# Patient Record
Sex: FEMALE | Race: WHITE | NOT HISPANIC OR LATINO | Employment: OTHER | URBAN - NONMETROPOLITAN AREA
[De-identification: names, ages, dates, MRNs, and addresses within clinical notes are randomized per-mention and may not be internally consistent; named-entity substitution may affect disease eponyms.]

---

## 2017-05-12 ENCOUNTER — TRANSCRIBE ORDERS (OUTPATIENT)
Dept: ADMINISTRATIVE | Facility: HOSPITAL | Age: 67
End: 2017-05-12

## 2017-05-12 DIAGNOSIS — Z13.9 SCREENING: Primary | ICD-10-CM

## 2017-06-06 ENCOUNTER — HOSPITAL ENCOUNTER (OUTPATIENT)
Dept: MAMMOGRAPHY | Facility: HOSPITAL | Age: 67
Discharge: HOME | End: 2017-06-06
Admitting: INTERNAL MEDICINE

## 2017-06-06 DIAGNOSIS — Z13.9 SCREENING: ICD-10-CM

## 2017-06-06 PROCEDURE — 77063 BREAST TOMOSYNTHESIS BI: CPT

## 2017-06-06 PROCEDURE — G0202 SCR MAMMO BI INCL CAD: HCPCS

## 2017-06-26 ENCOUNTER — TRANSCRIBE ORDERS (OUTPATIENT)
Dept: ADMINISTRATIVE | Facility: HOSPITAL | Age: 67
End: 2017-06-26

## 2017-06-26 ENCOUNTER — HOSPITAL ENCOUNTER (OUTPATIENT)
Dept: GENERAL RADIOLOGY | Facility: HOSPITAL | Age: 67
Discharge: HOME | End: 2017-06-26
Admitting: INTERNAL MEDICINE

## 2017-06-26 DIAGNOSIS — M25.552 LEFT HIP PAIN: Primary | ICD-10-CM

## 2017-06-26 PROCEDURE — 73502 X-RAY EXAM HIP UNI 2-3 VIEWS: CPT

## 2017-12-07 ENCOUNTER — TRANSCRIBE ORDERS (OUTPATIENT)
Dept: BONE DENSITY | Facility: HOSPITAL | Age: 67
End: 2017-12-07

## 2017-12-07 ENCOUNTER — TRANSCRIBE ORDERS (OUTPATIENT)
Dept: ULTRASOUND IMAGING | Facility: HOSPITAL | Age: 67
End: 2017-12-07

## 2017-12-07 DIAGNOSIS — Z13.820 SCREENING FOR OSTEOPOROSIS: Primary | ICD-10-CM

## 2017-12-12 ENCOUNTER — TRANSCRIBE ORDERS (OUTPATIENT)
Dept: BONE DENSITY | Facility: HOSPITAL | Age: 67
End: 2017-12-12

## 2017-12-12 DIAGNOSIS — Z78.0 POST-MENOPAUSAL: Primary | ICD-10-CM

## 2018-01-04 ENCOUNTER — APPOINTMENT (OUTPATIENT)
Dept: BONE DENSITY | Facility: HOSPITAL | Age: 68
End: 2018-01-04

## 2018-01-04 DIAGNOSIS — Z78.0 POST-MENOPAUSAL: ICD-10-CM

## 2018-01-04 PROCEDURE — 77080 DXA BONE DENSITY AXIAL: CPT

## 2018-02-14 ENCOUNTER — HOSPITAL ENCOUNTER (OUTPATIENT)
Dept: GENERAL RADIOLOGY | Facility: HOSPITAL | Age: 68
Discharge: HOME | End: 2018-02-14
Admitting: FAMILY MEDICINE

## 2018-02-14 ENCOUNTER — LAB (OUTPATIENT)
Dept: LAB | Facility: HOSPITAL | Age: 68
End: 2018-02-14

## 2018-02-14 ENCOUNTER — TRANSCRIBE ORDERS (OUTPATIENT)
Dept: ADMINISTRATIVE | Facility: HOSPITAL | Age: 68
End: 2018-02-14

## 2018-02-14 DIAGNOSIS — J10.1 INFLUENZA B: ICD-10-CM

## 2018-02-14 DIAGNOSIS — J10.1 INFLUENZA B: Primary | ICD-10-CM

## 2018-02-14 PROCEDURE — 71046 X-RAY EXAM CHEST 2 VIEWS: CPT

## 2018-05-08 ENCOUNTER — TRANSCRIBE ORDERS (OUTPATIENT)
Dept: ADMINISTRATIVE | Facility: HOSPITAL | Age: 68
End: 2018-05-08

## 2018-05-08 DIAGNOSIS — Z12.39 SCREENING BREAST EXAMINATION: Primary | ICD-10-CM

## 2018-06-12 ENCOUNTER — APPOINTMENT (OUTPATIENT)
Dept: MAMMOGRAPHY | Facility: HOSPITAL | Age: 68
End: 2018-06-12

## 2018-06-18 ENCOUNTER — HOSPITAL ENCOUNTER (OUTPATIENT)
Dept: MAMMOGRAPHY | Facility: HOSPITAL | Age: 68
Discharge: HOME | End: 2018-06-18
Admitting: FAMILY MEDICINE

## 2018-06-18 DIAGNOSIS — Z12.39 SCREENING BREAST EXAMINATION: ICD-10-CM

## 2018-06-18 PROCEDURE — 77063 BREAST TOMOSYNTHESIS BI: CPT

## 2018-06-18 PROCEDURE — 77067 SCR MAMMO BI INCL CAD: CPT

## 2019-05-13 ENCOUNTER — TRANSCRIBE ORDERS (OUTPATIENT)
Dept: MAMMOGRAPHY | Facility: HOSPITAL | Age: 69
End: 2019-05-13

## 2019-05-13 DIAGNOSIS — Z12.39 SCREENING FOR BREAST CANCER: Primary | ICD-10-CM

## 2019-07-03 ENCOUNTER — HOSPITAL ENCOUNTER (OUTPATIENT)
Dept: MAMMOGRAPHY | Facility: HOSPITAL | Age: 69
Discharge: HOME | End: 2019-07-03
Admitting: FAMILY MEDICINE

## 2019-07-03 DIAGNOSIS — Z12.39 SCREENING FOR BREAST CANCER: ICD-10-CM

## 2019-07-03 PROCEDURE — 77063 BREAST TOMOSYNTHESIS BI: CPT

## 2019-07-03 PROCEDURE — 77067 SCR MAMMO BI INCL CAD: CPT

## 2020-05-26 ENCOUNTER — TRANSCRIBE ORDERS (OUTPATIENT)
Dept: ADMINISTRATIVE | Facility: HOSPITAL | Age: 70
End: 2020-05-26

## 2020-05-26 DIAGNOSIS — Z12.31 VISIT FOR SCREENING MAMMOGRAM: Primary | ICD-10-CM

## 2020-05-27 ENCOUNTER — TRANSCRIBE ORDERS (OUTPATIENT)
Dept: ADMINISTRATIVE | Facility: HOSPITAL | Age: 70
End: 2020-05-27

## 2020-05-27 DIAGNOSIS — Z12.31 VISIT FOR SCREENING MAMMOGRAM: Primary | ICD-10-CM

## 2020-07-07 ENCOUNTER — HOSPITAL ENCOUNTER (OUTPATIENT)
Dept: MAMMOGRAPHY | Facility: HOSPITAL | Age: 70
Discharge: HOME | End: 2020-07-07
Admitting: INTERNAL MEDICINE

## 2020-07-07 ENCOUNTER — APPOINTMENT (OUTPATIENT)
Dept: MAMMOGRAPHY | Facility: HOSPITAL | Age: 70
End: 2020-07-07

## 2020-07-07 DIAGNOSIS — Z12.31 VISIT FOR SCREENING MAMMOGRAM: ICD-10-CM

## 2020-07-07 PROCEDURE — 77067 SCR MAMMO BI INCL CAD: CPT

## 2020-07-07 PROCEDURE — 77063 BREAST TOMOSYNTHESIS BI: CPT

## 2021-06-17 ENCOUNTER — TRANSCRIBE ORDERS (OUTPATIENT)
Dept: ADMINISTRATIVE | Facility: HOSPITAL | Age: 71
End: 2021-06-17

## 2021-06-17 DIAGNOSIS — Z12.31 VISIT FOR SCREENING MAMMOGRAM: Primary | ICD-10-CM

## 2021-08-02 ENCOUNTER — HOSPITAL ENCOUNTER (OUTPATIENT)
Dept: MAMMOGRAPHY | Facility: HOSPITAL | Age: 71
Discharge: HOME | End: 2021-08-02
Admitting: INTERNAL MEDICINE

## 2021-08-02 DIAGNOSIS — Z12.31 VISIT FOR SCREENING MAMMOGRAM: ICD-10-CM

## 2021-08-02 PROCEDURE — 77063 BREAST TOMOSYNTHESIS BI: CPT

## 2021-08-02 PROCEDURE — 77067 SCR MAMMO BI INCL CAD: CPT

## 2022-06-02 ENCOUNTER — TRANSCRIBE ORDERS (OUTPATIENT)
Dept: ADMINISTRATIVE | Facility: HOSPITAL | Age: 72
End: 2022-06-02

## 2022-06-02 DIAGNOSIS — Z13.820 SCREENING FOR OSTEOPOROSIS: Primary | ICD-10-CM

## 2022-07-05 ENCOUNTER — TRANSCRIBE ORDERS (OUTPATIENT)
Dept: ADMINISTRATIVE | Facility: HOSPITAL | Age: 72
End: 2022-07-05

## 2022-07-05 DIAGNOSIS — Z12.31 VISIT FOR SCREENING MAMMOGRAM: Primary | ICD-10-CM

## 2022-09-09 ENCOUNTER — HOSPITAL ENCOUNTER (OUTPATIENT)
Dept: MAMMOGRAPHY | Facility: HOSPITAL | Age: 72
Discharge: HOME | End: 2022-09-09
Admitting: FAMILY MEDICINE

## 2022-09-09 DIAGNOSIS — Z12.31 VISIT FOR SCREENING MAMMOGRAM: ICD-10-CM

## 2022-09-09 PROCEDURE — 77067 SCR MAMMO BI INCL CAD: CPT

## 2022-09-09 PROCEDURE — 77063 BREAST TOMOSYNTHESIS BI: CPT

## 2023-08-03 ENCOUNTER — TRANSCRIBE ORDERS (OUTPATIENT)
Dept: ADMINISTRATIVE | Facility: HOSPITAL | Age: 73
End: 2023-08-03
Payer: MEDICARE

## 2023-08-03 DIAGNOSIS — Z12.31 VISIT FOR SCREENING MAMMOGRAM: Primary | ICD-10-CM

## 2023-11-09 ENCOUNTER — HOSPITAL ENCOUNTER (OUTPATIENT)
Dept: MAMMOGRAPHY | Facility: HOSPITAL | Age: 73
Discharge: HOME | End: 2023-11-09
Admitting: FAMILY MEDICINE
Payer: MEDICARE

## 2023-11-09 DIAGNOSIS — Z12.31 VISIT FOR SCREENING MAMMOGRAM: ICD-10-CM

## 2023-11-09 PROCEDURE — 77063 BREAST TOMOSYNTHESIS BI: CPT

## 2023-11-09 PROCEDURE — 77067 SCR MAMMO BI INCL CAD: CPT

## 2024-10-16 ENCOUNTER — TRANSCRIBE ORDERS (OUTPATIENT)
Dept: ADMINISTRATIVE | Facility: HOSPITAL | Age: 74
End: 2024-10-16
Payer: MEDICARE

## 2024-10-16 DIAGNOSIS — Z12.31 SCREENING MAMMOGRAM FOR BREAST CANCER: Primary | ICD-10-CM

## 2025-01-30 ENCOUNTER — HOSPITAL ENCOUNTER (OUTPATIENT)
Dept: MAMMOGRAPHY | Facility: HOSPITAL | Age: 75
Discharge: HOME | End: 2025-01-30
Admitting: FAMILY MEDICINE
Payer: MEDICARE

## 2025-01-30 DIAGNOSIS — Z12.31 SCREENING MAMMOGRAM FOR BREAST CANCER: ICD-10-CM

## 2025-01-30 PROCEDURE — 77067 SCR MAMMO BI INCL CAD: CPT

## 2025-01-30 PROCEDURE — 77063 BREAST TOMOSYNTHESIS BI: CPT

## 2025-03-13 ENCOUNTER — DOCUMENTATION (OUTPATIENT)
Dept: FAMILY MEDICINE CLINIC | Facility: CLINIC | Age: 75
End: 2025-03-13
Payer: MEDICARE

## 2025-03-13 RX ORDER — CHOLECALCIFEROL (VITAMIN D3) 25 MCG
1000 TABLET ORAL DAILY
COMMUNITY

## 2025-03-13 RX ORDER — MICONAZOLE NITRATE 20 MG/G
1 CREAM TOPICAL 2 TIMES DAILY
COMMUNITY

## 2025-03-13 RX ORDER — PANTOPRAZOLE SODIUM 40 MG/1
40 TABLET, DELAYED RELEASE ORAL DAILY
COMMUNITY

## 2025-03-13 RX ORDER — PREDNISONE 5 MG/1
5 TABLET ORAL TAKE AS DIRECTED
COMMUNITY

## 2025-03-13 RX ORDER — ASPIRIN 81 MG/1
81 TABLET, CHEWABLE ORAL DAILY
COMMUNITY

## 2025-03-13 RX ORDER — FOLIC ACID 1 MG/1
1 TABLET ORAL DAILY
COMMUNITY

## 2025-03-13 RX ORDER — METHOCARBAMOL 500 MG/1
500 TABLET, FILM COATED ORAL EVERY 6 HOURS PRN
COMMUNITY

## 2025-03-13 RX ORDER — SENNOSIDES A AND B 8.6 MG/1
1 TABLET, FILM COATED ORAL NIGHTLY
COMMUNITY

## 2025-03-13 RX ORDER — CITALOPRAM HYDROBROMIDE 10 MG/1
10 TABLET ORAL DAILY
COMMUNITY

## 2025-03-13 RX ORDER — ROFLUMILAST 3 MG/G
1 CREAM TOPICAL 2 TIMES DAILY
COMMUNITY

## 2025-03-13 RX ORDER — LORATADINE 10 MG/1
10 TABLET ORAL DAILY
COMMUNITY

## 2025-03-13 RX ORDER — CLOBETASOL PROPIONATE 0.5 MG/G
1 CREAM TOPICAL 2 TIMES DAILY
COMMUNITY

## 2025-03-13 RX ORDER — POLYETHYLENE GLYCOL 3350 17 G/17G
17 POWDER, FOR SOLUTION ORAL 2 TIMES DAILY
COMMUNITY

## 2025-03-13 RX ORDER — CALCIPOTRIENE 50 UG/G
1 CREAM TOPICAL 2 TIMES DAILY
COMMUNITY

## 2025-03-13 RX ORDER — OXYCODONE HYDROCHLORIDE 5 MG/1
5 TABLET ORAL EVERY 4 HOURS PRN
COMMUNITY
End: 2025-03-13 | Stop reason: SDUPTHER

## 2025-03-13 RX ORDER — ACETAMINOPHEN 325 MG/1
650 TABLET ORAL 4 TIMES DAILY
COMMUNITY

## 2025-03-13 RX ORDER — LEVOTHYROXINE SODIUM 75 UG/1
75 TABLET ORAL
COMMUNITY

## 2025-03-13 RX ORDER — OXYCODONE HYDROCHLORIDE 5 MG/1
5 TABLET ORAL EVERY 4 HOURS PRN
Qty: 180 TABLET | Refills: 0 | Status: SHIPPED | OUTPATIENT
Start: 2025-03-13

## 2025-03-13 NOTE — TELEPHONE ENCOUNTER
(NEW ADMIT)    [facility] REQUESTING MED REFILL FOR OXYCODONE 5 MG.    DIRECTIONS: OXYCODONE 5 MG 1 TAB PO Q 4 HRS PRN.

## 2025-03-14 RX ORDER — GABAPENTIN 100 MG/1
100 CAPSULE ORAL 3 TIMES DAILY
Qty: 90 CAPSULE | Refills: 3 | Status: SHIPPED | OUTPATIENT
Start: 2025-03-14

## 2025-03-18 ENCOUNTER — NURSING HOME (OUTPATIENT)
Dept: INTERNAL MEDICINE | Facility: CLINIC | Age: 75
End: 2025-03-18
Payer: MEDICARE

## 2025-03-18 VITALS
BODY MASS INDEX: 17.87 KG/M2 | HEIGHT: 60 IN | TEMPERATURE: 97.9 F | DIASTOLIC BLOOD PRESSURE: 61 MMHG | RESPIRATION RATE: 8 BRPM | WEIGHT: 91 LBS | OXYGEN SATURATION: 96 % | HEART RATE: 84 BPM | SYSTOLIC BLOOD PRESSURE: 85 MMHG

## 2025-03-18 DIAGNOSIS — R53.81 DEBILITY: Primary | ICD-10-CM

## 2025-03-18 DIAGNOSIS — E44.0 MODERATE PROTEIN-CALORIE MALNUTRITION: ICD-10-CM

## 2025-03-18 DIAGNOSIS — E03.9 HYPOTHYROIDISM, UNSPECIFIED TYPE: ICD-10-CM

## 2025-03-18 DIAGNOSIS — N17.9 AKI (ACUTE KIDNEY INJURY): ICD-10-CM

## 2025-03-18 DIAGNOSIS — M62.838 MUSCLE SPASM: ICD-10-CM

## 2025-03-18 DIAGNOSIS — F32.A ANXIETY AND DEPRESSION: ICD-10-CM

## 2025-03-18 DIAGNOSIS — F41.9 ANXIETY AND DEPRESSION: ICD-10-CM

## 2025-03-18 DIAGNOSIS — G89.4 CHRONIC PAIN SYNDROME: ICD-10-CM

## 2025-03-18 DIAGNOSIS — H04.123 DRY EYES: ICD-10-CM

## 2025-03-18 DIAGNOSIS — J30.2 SEASONAL ALLERGIES: ICD-10-CM

## 2025-03-18 DIAGNOSIS — K21.9 GASTROESOPHAGEAL REFLUX DISEASE WITHOUT ESOPHAGITIS: ICD-10-CM

## 2025-03-18 DIAGNOSIS — L40.9 PSORIASIS: ICD-10-CM

## 2025-03-18 PROCEDURE — 99305 1ST NF CARE MODERATE MDM 35: CPT | Performed by: INTERNAL MEDICINE

## 2025-03-18 NOTE — LETTER
Nursing Home History and Physical       Louis Ayala DO  [address] [place] [address] Phone: [phone]  Fax: [phone]     PATIENT NAME: Sen Rivera                                                                          YOB: 1950           DATE OF SERVICE: 03/18/2025  FACILITY: [facility]    CHIEF COMPLAINT:  Nursing facility admission    History of Present Illness  The patient is a 74-year-old female with a history of inverse psoriasis, obstructive sleep apnea, anxiety, and hypothyroidism. She was recently hospitalized at  for concerns of unintentional weight loss, generalized weakness, and falls. The patient was treated for generalized debility. She was evaluated by the rheumatology services and it was noted that she had failed multiple therapies in the past. The patient was started on clobetasol, calcipotriene, and Neulasta with a prednisone taper while in the hospital. Nutrition services did evaluate the patient considering concerns of 60-pound weight loss recently. IV fluids were also given for concerns of JIMBO. The patient was stabilized and then discharged to this facility for further strengthening and rehab.    She reports experiencing chills and a sensation of intense heat last night, prompting a visit from the nurse. Despite these symptoms, she did not have a fever. She was administered a pain medication and a sleep aid, but she found it difficult to sleep. She has been compliant with her therapy sessions, including speech therapy this morning. She reports feeling better overall but continues to struggle with food intake. Her weight has decreased to 89 pounds, and she experiences nausea when attempting to eat. She was prescribed Zofran for this issue. Although she gained 6 pounds last week, her weight has since returned to 89 pounds. She is currently undergoing physical therapy to improve her walking and balance. She experiences dizziness upon  standing, which she manages by remaining stationary for a few minutes. She has a lifelong history of low blood pressure and is not on any antihypertensive medications. She only takes pain medication as needed and has been prescribed gabapentin three times daily during her hospital stay. She is uncertain about the effectiveness of gabapentin and did not take it prior to her hospitalization.    FAMILY HISTORY  Her mother had low blood pressure. She reports no family history of diabetes.    MEDICATIONS  Current: clobetasol, calcipotriene, Roflumilast, prednisone, levothyroxine, Celexa, loratadine, methocarbamol, Protonix, Xiidra, gabapentin       PAST MEDICAL & SURGICAL HISTORY:   Past Medical History:   Diagnosis Date   • Acute kidney injury    • Acute metabolic acidosis    • Allergic    • Breast cancer    • Cellulitis    • Constipation, unspecified    • Debility    • Drug therapy    • JANAE (generalized anxiety disorder)    • GERD (gastroesophageal reflux disease)    • History of falling    • Hx of radiation therapy    • Hypokalemia    • Hypothyroidism    • Inverse psoriasis    • Other psoriasis    • Personal history of malignant neoplasm of breast    • Primary generalized (osteo)arthritis    • Unintentional weight loss    • Unspecified protein-calorie malnutrition    • Urinary tract infection    • Vitamin D deficiency    • Vitamin deficiency, unspecified    • Weakness       Past Surgical History:   Procedure Laterality Date   • BREAST BIOPSY     • BREAST LUMPECTOMY Left    • CATARACT EXTRACTION     •  SECTION     • KNEE SURGERY     • THYROID SURGERY           MEDICATIONS:  I have reviewed and reconciled the patients medication list in the patients chart at the [facility] nursing [facility] on 2025.      ALLERGIES:  Allergies   Allergen Reactions   • Augmentin [Amoxicillin-Pot Clavulanate] Unknown - High Severity         SOCIAL HISTORY:  Social History     Socioeconomic History   • Marital status: Single  "  Tobacco Use   • Smoking status: Former     Types: Cigarettes   • Smokeless tobacco: Never   Substance and Sexual Activity   • Alcohol use: Never   • Drug use: Never   • Sexual activity: Defer       FAMILY HISTORY:  Family History   Problem Relation Age of Onset   • Cancer Father    • Lung cancer Father    • Cancer Brother    • Stomach cancer Brother    • Breast cancer Neg Hx         REVIEW OF SYSTEMS:  Review of Systems   Constitutional:  Negative for chills, fatigue and fever.   HENT:  Negative for congestion, ear pain, rhinorrhea, sinus pressure and sore throat.    Eyes:  Negative for visual disturbance.   Respiratory:  Negative for cough, chest tightness, shortness of breath and wheezing.    Cardiovascular:  Negative for chest pain, palpitations and leg swelling.   Gastrointestinal:  Negative for abdominal pain, blood in stool, constipation, diarrhea, nausea and vomiting.   Endocrine: Negative for polydipsia and polyuria.   Genitourinary:  Negative for dysuria and hematuria.   Musculoskeletal:  Negative for arthralgias and back pain.   Skin:  Negative for rash.   Neurological:  Negative for dizziness, light-headedness, numbness and headaches.   Psychiatric/Behavioral:  Negative for dysphoric mood and sleep disturbance. The patient is not nervous/anxious.          PHYSICAL EXAMINATION:   VITAL SIGNS: BP (!) 85/61 Comment: VITALS EMAILED PER  [facility]PTI [facility] AT THE [facility].  Pulse 84   Temp 97.9 °F (36.6 °C)   Resp 8   Ht 152.4 cm (60\")   Wt 41.3 kg (91 lb)   SpO2 96%   BMI 17.77 kg/m²     Physical Exam  Vitals and nursing note reviewed.   Constitutional:       Appearance: Normal appearance. She is well-developed.   HENT:      Head: Normocephalic and atraumatic.      Nose: Nose normal.      Mouth/Throat:      Mouth: Mucous membranes are moist.      Pharynx: No oropharyngeal exudate.   Eyes:      General: No scleral icterus.     Extraocular Movements: Extraocular movements intact.      Conjunctiva/sclera: " Conjunctivae normal.      Pupils: Pupils are equal, round, and reactive to light.   Neck:      Thyroid: No thyromegaly.   Cardiovascular:      Rate and Rhythm: Normal rate and regular rhythm.      Heart sounds: Normal heart sounds. No murmur heard.     No friction rub. No gallop.   Pulmonary:      Effort: Pulmonary effort is normal. No respiratory distress.      Breath sounds: Normal breath sounds. No wheezing.   Abdominal:      General: Bowel sounds are normal. There is no distension.      Palpations: Abdomen is soft.      Tenderness: There is no abdominal tenderness.   Musculoskeletal:         General: No deformity or signs of injury.      Cervical back: Normal range of motion and neck supple.   Lymphadenopathy:      Cervical: No cervical adenopathy.   Skin:     General: Skin is warm and dry.      Findings: No rash.   Neurological:      General: No focal deficit present.      Mental Status: She is alert and oriented to person, place, and time.   Psychiatric:         Mood and Affect: Mood normal.         Behavior: Behavior normal.         RECORDS REVIEW:   Discharge Summary [facility] 3/12/2025  Results  Laboratory Studies  Labs reviewed dated 03/13/2025, sodium 136, creatinine 0.6, albumin 2.3. Lipids in normal range. WBC of 7.1, hemoglobin 8.0, platelets 190. TSH 5.31, free T4 0.71.      ASSESSMENT   Diagnoses and all orders for this visit:    1. Debility (Primary)    2. Psoriasis    3. Moderate protein-calorie malnutrition    4. JIMBO (acute kidney injury)    5. Hypothyroidism, unspecified type    6. Chronic pain syndrome    7. Anxiety and depression    8. Seasonal allergies    9. Muscle spasm    10. Gastroesophageal reflux disease without esophagitis    11. Dry eyes        Assessment & Plan  1. Debility and weakness.  Continue PT, OT, and rehabilitation at the facility for strengthening. Continue supportive care at the nursing facility.    2. Perineal psoriatic lesions/inverse psoriasis.  Currently on a regimen  of topical clobetasol, calcipotriene, and roflumilast, along with an oral prednisone taper. Follow up with rheumatology as an outpatient.    3. Unintentional weight loss, protein-calorie malnutrition.  BMI was 17 upon admission at the hospital with significant recent weight loss. Started on protein supplements. Oral intake is to be monitored closely in the nursing facility.    4. Acute kidney injury.  Creatinine was elevated at 1.29 during hospitalization and improved with IV fluids. Renal function to be monitored in the nursing facility.    5. Hypothyroidism.  Continue levothyroxine 75 mcg daily. TSH to be monitored in the nursing facility.    6. Chronic pain.  Stable on current pain medication regimen. Gabapentin will be held for 5 days to assess its impact on balance and dizziness. If symptoms improve, the medication will be discontinued.    7. Anxiety and depression.  Continue Celexa 10 mg daily.    8. Seasonal allergies.  Continue loratadine 10 mg daily.    9. Muscle spasms.  Continue methocarbamol 500 mg orally as needed.    10. Gastroesophageal reflux disease (GERD).  Continue Protonix.    11. Dry eyes.  Continue Xiidra solution, 1 drop to eyes twice a day.         [x]  Discussed Patient in detail with nursing/staff, addressed all needs today.     [x]  Plan of Care Reviewed   [x]  PT/OT Reviewed   []  Order Changes  []  Discharge Plans Reviewed  [x]  Advance Directive on file with Nursing Home.   [x]  POA on file with Nursing Home.    [x]  Code Status listed and reviewed.     Louis Ayala DO.  3/24/2025      **Part of this note may be an electronic transcription/translation of spoken language to printed text using the Dragon Dictation System.**    Patient or patient representative verbalized consent for the use of Ambient Listening during the visit with  Louis Ayala DO for chart documentation. 3/24/2025  02:48 EDT

## 2025-03-24 NOTE — PROGRESS NOTES
Nursing Home History and Physical       Louis Ayala DO  [address] [place] [address] Phone: [phone]  Fax: [phone]     PATIENT NAME: Sen Rivera                                                                          YOB: 1950           DATE OF SERVICE: 03/18/2025  FACILITY: [facility]    CHIEF COMPLAINT:  Nursing facility admission    History of Present Illness  The patient is a 74-year-old female with a history of inverse psoriasis, obstructive sleep apnea, anxiety, and hypothyroidism. She was recently hospitalized at  for concerns of unintentional weight loss, generalized weakness, and falls. The patient was treated for generalized debility. She was evaluated by the rheumatology services and it was noted that she had failed multiple therapies in the past. The patient was started on clobetasol, calcipotriene, and Neulasta with a prednisone taper while in the hospital. Nutrition services did evaluate the patient considering concerns of 60-pound weight loss recently. IV fluids were also given for concerns of JIMBO. The patient was stabilized and then discharged to this facility for further strengthening and rehab.    She reports experiencing chills and a sensation of intense heat last night, prompting a visit from the nurse. Despite these symptoms, she did not have a fever. She was administered a pain medication and a sleep aid, but she found it difficult to sleep. She has been compliant with her therapy sessions, including speech therapy this morning. She reports feeling better overall but continues to struggle with food intake. Her weight has decreased to 89 pounds, and she experiences nausea when attempting to eat. She was prescribed Zofran for this issue. Although she gained 6 pounds last week, her weight has since returned to 89 pounds. She is currently undergoing physical therapy to improve her walking and balance. She experiences dizziness upon  standing, which she manages by remaining stationary for a few minutes. She has a lifelong history of low blood pressure and is not on any antihypertensive medications. She only takes pain medication as needed and has been prescribed gabapentin three times daily during her hospital stay. She is uncertain about the effectiveness of gabapentin and did not take it prior to her hospitalization.    FAMILY HISTORY  Her mother had low blood pressure. She reports no family history of diabetes.    MEDICATIONS  Current: clobetasol, calcipotriene, Roflumilast, prednisone, levothyroxine, Celexa, loratadine, methocarbamol, Protonix, Xiidra, gabapentin       PAST MEDICAL & SURGICAL HISTORY:   Past Medical History:   Diagnosis Date    Acute kidney injury     Acute metabolic acidosis     Allergic     Breast cancer     Cellulitis     Constipation, unspecified     Debility     Drug therapy     JANAE (generalized anxiety disorder)     GERD (gastroesophageal reflux disease)     History of falling     Hx of radiation therapy     Hypokalemia     Hypothyroidism     Inverse psoriasis     Other psoriasis     Personal history of malignant neoplasm of breast     Primary generalized (osteo)arthritis     Unintentional weight loss     Unspecified protein-calorie malnutrition     Urinary tract infection     Vitamin D deficiency     Vitamin deficiency, unspecified     Weakness       Past Surgical History:   Procedure Laterality Date    BREAST BIOPSY      BREAST LUMPECTOMY Left 2006    CATARACT EXTRACTION       SECTION      KNEE SURGERY      THYROID SURGERY           MEDICATIONS:  I have reviewed and reconciled the patients medication list in the patients chart at the [facility] nursing [facility] on 2025.      ALLERGIES:  Allergies   Allergen Reactions    Augmentin [Amoxicillin-Pot Clavulanate] Unknown - High Severity         SOCIAL HISTORY:  Social History     Socioeconomic History    Marital status: Single   Tobacco Use    Smoking status:  "Former     Types: Cigarettes    Smokeless tobacco: Never   Substance and Sexual Activity    Alcohol use: Never    Drug use: Never    Sexual activity: Defer       FAMILY HISTORY:  Family History   Problem Relation Age of Onset    Cancer Father     Lung cancer Father     Cancer Brother     Stomach cancer Brother     Breast cancer Neg Hx         REVIEW OF SYSTEMS:  Review of Systems   Constitutional:  Negative for chills, fatigue and fever.   HENT:  Negative for congestion, ear pain, rhinorrhea, sinus pressure and sore throat.    Eyes:  Negative for visual disturbance.   Respiratory:  Negative for cough, chest tightness, shortness of breath and wheezing.    Cardiovascular:  Negative for chest pain, palpitations and leg swelling.   Gastrointestinal:  Negative for abdominal pain, blood in stool, constipation, diarrhea, nausea and vomiting.   Endocrine: Negative for polydipsia and polyuria.   Genitourinary:  Negative for dysuria and hematuria.   Musculoskeletal:  Negative for arthralgias and back pain.   Skin:  Negative for rash.   Neurological:  Negative for dizziness, light-headedness, numbness and headaches.   Psychiatric/Behavioral:  Negative for dysphoric mood and sleep disturbance. The patient is not nervous/anxious.          PHYSICAL EXAMINATION:   VITAL SIGNS: BP (!) 85/61 Comment: VITALS EMAILED PER  [facility]UPTI INFANTE AT THE [facility].  Pulse 84   Temp 97.9 °F (36.6 °C)   Resp 8   Ht 152.4 cm (60\")   Wt 41.3 kg (91 lb)   SpO2 96%   BMI 17.77 kg/m²     Physical Exam  Vitals and nursing note reviewed.   Constitutional:       Appearance: Normal appearance. She is well-developed.   HENT:      Head: Normocephalic and atraumatic.      Nose: Nose normal.      Mouth/Throat:      Mouth: Mucous membranes are moist.      Pharynx: No oropharyngeal exudate.   Eyes:      General: No scleral icterus.     Extraocular Movements: Extraocular movements intact.      Conjunctiva/sclera: Conjunctivae normal.      Pupils: Pupils are " equal, round, and reactive to light.   Neck:      Thyroid: No thyromegaly.   Cardiovascular:      Rate and Rhythm: Normal rate and regular rhythm.      Heart sounds: Normal heart sounds. No murmur heard.     No friction rub. No gallop.   Pulmonary:      Effort: Pulmonary effort is normal. No respiratory distress.      Breath sounds: Normal breath sounds. No wheezing.   Abdominal:      General: Bowel sounds are normal. There is no distension.      Palpations: Abdomen is soft.      Tenderness: There is no abdominal tenderness.   Musculoskeletal:         General: No deformity or signs of injury.      Cervical back: Normal range of motion and neck supple.   Lymphadenopathy:      Cervical: No cervical adenopathy.   Skin:     General: Skin is warm and dry.      Findings: No rash.   Neurological:      General: No focal deficit present.      Mental Status: She is alert and oriented to person, place, and time.   Psychiatric:         Mood and Affect: Mood normal.         Behavior: Behavior normal.         RECORDS REVIEW:   Discharge Summary [facility] 3/12/2025  Results  Laboratory Studies  Labs reviewed dated 03/13/2025, sodium 136, creatinine 0.6, albumin 2.3. Lipids in normal range. WBC of 7.1, hemoglobin 8.0, platelets 190. TSH 5.31, free T4 0.71.      ASSESSMENT   Diagnoses and all orders for this visit:    1. Debility (Primary)    2. Psoriasis    3. Moderate protein-calorie malnutrition    4. JIMBO (acute kidney injury)    5. Hypothyroidism, unspecified type    6. Chronic pain syndrome    7. Anxiety and depression    8. Seasonal allergies    9. Muscle spasm    10. Gastroesophageal reflux disease without esophagitis    11. Dry eyes        Assessment & Plan  1. Debility and weakness.  Continue PT, OT, and rehabilitation at the facility for strengthening. Continue supportive care at the nursing facility.    2. Perineal psoriatic lesions/inverse psoriasis.  Currently on a regimen of topical clobetasol, calcipotriene, and  roflumilast, along with an oral prednisone taper. Follow up with rheumatology as an outpatient.    3. Unintentional weight loss, protein-calorie malnutrition.  BMI was 17 upon admission at the hospital with significant recent weight loss. Started on protein supplements. Oral intake is to be monitored closely in the nursing facility.    4. Acute kidney injury.  Creatinine was elevated at 1.29 during hospitalization and improved with IV fluids. Renal function to be monitored in the nursing facility.    5. Hypothyroidism.  Continue levothyroxine 75 mcg daily. TSH to be monitored in the nursing facility.    6. Chronic pain.  Stable on current pain medication regimen. Gabapentin will be held for 5 days to assess its impact on balance and dizziness. If symptoms improve, the medication will be discontinued.    7. Anxiety and depression.  Continue Celexa 10 mg daily.    8. Seasonal allergies.  Continue loratadine 10 mg daily.    9. Muscle spasms.  Continue methocarbamol 500 mg orally as needed.    10. Gastroesophageal reflux disease (GERD).  Continue Protonix.    11. Dry eyes.  Continue Xiidra solution, 1 drop to eyes twice a day.         [x]  Discussed Patient in detail with nursing/staff, addressed all needs today.     [x]  Plan of Care Reviewed   [x]  PT/OT Reviewed   []  Order Changes  []  Discharge Plans Reviewed  [x]  Advance Directive on file with Nursing Home.   [x]  POA on file with Nursing Home.    [x]  Code Status listed and reviewed.     Louis Ayala DO.  3/24/2025      **Part of this note may be an electronic transcription/translation of spoken language to printed text using the Dragon Dictation System.**    Patient or patient representative verbalized consent for the use of Ambient Listening during the visit with  Louis Ayala DO for chart documentation. 3/24/2025  02:48 EDT

## 2025-04-04 RX ORDER — OXYCODONE HYDROCHLORIDE 5 MG/1
5 TABLET ORAL EVERY 4 HOURS PRN
Qty: 30 TABLET | Refills: 0 | Status: SHIPPED | OUTPATIENT
Start: 2025-04-04

## 2025-04-04 NOTE — TELEPHONE ENCOUNTER
(DISCHARGE SCRIPT)    [facility] REQUESTING MED REFILL FOR OXYCODONE 5 MG.    DIRECTIONS: OXYCODONE 5 MG 1 TAB PO Q 4 HRS PRN.

## 2025-04-23 ENCOUNTER — OUTSIDE FACILITY SERVICE (OUTPATIENT)
Dept: INTERNAL MEDICINE | Facility: CLINIC | Age: 75
End: 2025-04-23
Payer: MEDICARE